# Patient Record
Sex: MALE | Race: WHITE | Employment: FULL TIME | URBAN - METROPOLITAN AREA
[De-identification: names, ages, dates, MRNs, and addresses within clinical notes are randomized per-mention and may not be internally consistent; named-entity substitution may affect disease eponyms.]

---

## 2021-02-09 NOTE — PROGRESS NOTES
3  SUBJECTIVE:   CC: Obey Stout is an 45 year old male who presents for preventive health visit.     Patient has been advised of split billing requirements and indicates understanding: Yes     Healthy Habits:    Do you get at least three servings of calcium containing foods daily (dairy, green leafy vegetables, etc.)? yes    Amount of exercise or daily activities, outside of work: on his feet for work all day    Problems taking medications regularly No    Medication side effects: No    Have you had an eye exam in the past two years? no    Do you see a dentist twice per year? yes    Do you have sleep apnea, excessive snoring or daytime drowsiness? yes      PROBLEMS TO ADD ON...  Hearing Loss  - patient is a [place] around [place]  - many loud rock concerts when he was younger  - not sure, but questioning his hearing acuity   - is interested in getting his hearing tested    Sex Drive and erectile dysfunction  - has been doing some on line research and seen some information about possible low testosterone  - is interested in testing for this    Skin concerns  - his wife has notices a few bumps on his skin that she is concerned about, would like Obey to look into seeing a dermatologis    Previous ADHD diagnosis  - diagnosed as a child, was on medication for awhile but then stopped  - was previously seeing what sounds like a psychologist or a counselor to address this as an adult  - it doesn't sound like he was being prescribed stimulant medication at this time  - wanted to discuss treatment options with me regarding ADHD management        Today's PHQ-2 Score:   PHQ-2 ( 1999 [employer]) 2/12/2021   Q1: Little interest or pleasure in doing things 0   Q2: Feeling down, depressed or hopeless 0   PHQ-2 Score 0       Abuse: Current or Past(Physical, Sexual or Emotional)- No  Do you feel safe in your environment? Yes        Social History     Tobacco Use     Smoking status: Not on file   Substance Use Topics      "Alcohol use: Not on file     If you drink alcohol do you typically have >3 drinks per day or >7 drinks per week? No                      Last PSA: No results found for: PSA    Reviewed orders with patient. Reviewed health maintenance and updated orders accordingly - Yes    Reviewed and updated as needed this visit by clinical staff  Tobacco  Allergies  Meds  Problems  Med Hx  Surg Hx  Fam Hx  Soc Hx          Reviewed and updated as needed this visit by Provider  Tobacco  Allergies  Meds  Problems  Med Hx  Surg Hx  Fam Hx  Soc Hx         Past Medical History:   Diagnosis Date     ED (erectile dysfunction) 3/18/2019      History reviewed. No pertinent surgical history.    ROS:  CONSTITUTIONAL: NEGATIVE for fever, chills, change in weight  INTEGUMENTARY/SKIN: NEGATIVE for worrisome rashes, moles or lesions  EYES: NEGATIVE for vision changes or irritation  ENT: NEGATIVE for ear, mouth and throat problems  RESP: NEGATIVE for significant cough or SOB  CV: NEGATIVE for chest pain, palpitations or peripheral edema  GI: NEGATIVE for nausea, abdominal pain, heartburn, or change in bowel habits   male: negative for dysuria, hematuria, decreased urinary stream, urethral discharge  MUSCULOSKELETAL: NEGATIVE for significant arthralgias or myalgia  NEURO: NEGATIVE for weakness, dizziness or paresthesias  PSYCHIATRIC: NEGATIVE for changes in mood or affect    OBJECTIVE:   /73 (BP Location: Right arm, Patient Position: Left side, Cuff Size: Adult Large)   Pulse 68   Temp 96.9  F (36.1  C) (Temporal)   Resp 16   Ht 1.867 m (6' 1.5\")   Wt 95.3 kg (210 lb)   SpO2 96%   BMI 27.33 kg/m    EXAM:  GENERAL: healthy, alert and no distress  EYES: Eyes grossly normal to inspection, PERRL and conjunctivae and sclerae normal  HENT: ear canals and TM's normal, nose and mouth without ulcers or lesions  NECK: no adenopathy, no asymmetry, masses, or scars and thyroid normal to palpation  RESP: lungs clear to auscultation " - no rales, rhonchi or wheezes  CV: regular rate and rhythm, normal S1 S2, no S3 or S4, no murmur, click or rub, no peripheral edema and peripheral pulses strong  ABDOMEN: soft, nontender, no hepatosplenomegaly, no masses and bowel sounds normal  MS: no gross musculoskeletal defects noted, no edema  SKIN: no suspicious lesions or rashes  NEURO: Normal strength and tone, mentation intact and speech normal  PSYCH: mentation appears normal, affect normal/bright    Diagnostic Test Results:  Labs reviewed in Epic    ASSESSMENT/PLAN:   Obey was seen today for physical.    Diagnoses and all orders for this visit:    Routine general medical examination at a health care facility  -     Basic metabolic panel    Screening for lipid disorders  -     Lipid Profile    Screening for HIV (human immunodeficiency virus)  -     HIV Screening    Need for hepatitis C screening test  -     Hepatitis C Screen Reflex to HCV RNA Quant and Genotype    Hearing loss, unspecified hearing loss type, unspecified laterality  -     PURE TONE AUDIOMETRY, AIR    Advised patient he likely does not need a referral to see a dermatologist for a skin check. Provided with contact information for [facility]. Will provide referral if needed.     Audiometry ordered as noted above. Patient provided contact information for [facility].     Discussed the need for either documentation of previous diagnostic assessment of ADHD or I can provide suggestions for locations for new testing before consideration of stimulant medication for the treatment of ADHD. Patient would like to try and get a hold of his previous counselor to see if those records would suffice for documentation. I will wait to hear further from patient. I have also provided a list of possible places patient may consider contacting for new testing.     Patient has been advised of split billing requirements and indicates understanding: Yes  COUNSELING:  Reviewed preventive  "health counseling, as reflected in patient instructions    Estimated body mass index is 27.33 kg/m  as calculated from the following:    Height as of this encounter: 1.867 m (6' 1.5\").    Weight as of this encounter: 95.3 kg (210 lb).    Weight management plan: Discussed healthy diet and exercise guidelines    He reports that he quit smoking about 16 years ago. He has never used smokeless tobacco.      Counseling Resources:  ATP IV Guidelines  Pooled Cohorts Equation Calculator  FRAX Risk Assessment  ICSI Preventive Guidelines  Dietary Guidelines for Americans, 2010  USDA's MyPlate  ASA Prophylaxis  Lung CA Screening    Getachew Ribera MD  [facility]  "

## 2021-02-09 NOTE — PATIENT INSTRUCTIONS
Preventive Health Recommendations  Male Ages 40 to 49    Yearly exam:             See your health care provider every year in order to  o   Review health changes.   o   Discuss preventive care.    o   Review your medicines if your doctor has prescribed any.    You should be tested each year for STDs (sexually transmitted diseases) if you re at risk.     Have a cholesterol test every 5 years.     Have a colonoscopy (test for colon cancer) if someone in your family has had colon cancer or polyps before age 50.     After age 45, have a diabetes test (fasting glucose). If you are at risk for diabetes, you should have this test every 3 years.      Talk with your health care provider about whether or not a prostate cancer screening test (PSA) is right for you.    Shots: Get a flu shot each year. Get a tetanus shot every 10 years.     Nutrition:    Eat at least 5 servings of fruits and vegetables daily.     Eat whole-grain bread, whole-wheat pasta and brown rice instead of white grains and rice.     Get adequate Calcium and Vitamin D.     Lifestyle    Exercise for at least 150 minutes a week (30 minutes a day, 5 days a week). This will help you control your weight and prevent disease.     Limit alcohol to one drink per day.     No smoking.     Wear sunscreen to prevent skin cancer.     See your dentist every six months for an exam and cleaning.    [facility]  [address],  [address]  [place] [address]  Phone: [phone]    =======================================================    [address]  [place] [address] [phone]    [facility] offers ADHD testing evaluation for only 18+. It is 2-5 appointments    =======================================================    [facility]  [email or web address]  Children and adults    [place]: [phone]  [facility]  [place]: [phone]  [place]: [phone]  [place]: [phone]  [facility]: [phone]  [facility]: [phone]    =======================================================    [facility]  Various locations  [email or web address]  ADHD evaluations for ages 6+  General Number for multiple locations: [phone]

## 2021-02-12 ENCOUNTER — OFFICE VISIT (OUTPATIENT)
Dept: FAMILY MEDICINE | Facility: CLINIC | Age: 46
End: 2021-02-12
Payer: COMMERCIAL

## 2021-02-12 VITALS
WEIGHT: 210 LBS | TEMPERATURE: 96.9 F | SYSTOLIC BLOOD PRESSURE: 114 MMHG | BODY MASS INDEX: 26.95 KG/M2 | RESPIRATION RATE: 16 BRPM | DIASTOLIC BLOOD PRESSURE: 73 MMHG | HEIGHT: 74 IN | HEART RATE: 68 BPM | OXYGEN SATURATION: 96 %

## 2021-02-12 DIAGNOSIS — Z11.4 SCREENING FOR HIV (HUMAN IMMUNODEFICIENCY VIRUS): ICD-10-CM

## 2021-02-12 DIAGNOSIS — Z11.59 NEED FOR HEPATITIS C SCREENING TEST: ICD-10-CM

## 2021-02-12 DIAGNOSIS — H91.90 HEARING LOSS, UNSPECIFIED HEARING LOSS TYPE, UNSPECIFIED LATERALITY: ICD-10-CM

## 2021-02-12 DIAGNOSIS — Z13.220 SCREENING FOR LIPID DISORDERS: ICD-10-CM

## 2021-02-12 DIAGNOSIS — Z00.00 ROUTINE GENERAL MEDICAL EXAMINATION AT A HEALTH CARE FACILITY: Primary | ICD-10-CM

## 2021-02-12 PROBLEM — N52.9 ED (ERECTILE DYSFUNCTION): Status: ACTIVE | Noted: 2019-03-18

## 2021-02-12 LAB
ANION GAP SERPL CALCULATED.3IONS-SCNC: 2 MMOL/L (ref 3–14)
BUN SERPL-MCNC: 17 MG/DL (ref 7–30)
CALCIUM SERPL-MCNC: 9.3 MG/DL (ref 8.5–10.1)
CHLORIDE SERPL-SCNC: 106 MMOL/L (ref 94–109)
CHOLEST SERPL-MCNC: 235 MG/DL
CO2 SERPL-SCNC: 31 MMOL/L (ref 20–32)
CREAT SERPL-MCNC: 0.99 MG/DL (ref 0.66–1.25)
GFR SERPL CREATININE-BSD FRML MDRD: >90 ML/MIN/{1.73_M2}
GLUCOSE SERPL-MCNC: 84 MG/DL (ref 70–99)
HDLC SERPL-MCNC: 60 MG/DL
LDLC SERPL CALC-MCNC: 143 MG/DL
NONHDLC SERPL-MCNC: 175 MG/DL
POTASSIUM SERPL-SCNC: 4 MMOL/L (ref 3.4–5.3)
SODIUM SERPL-SCNC: 140 MMOL/L (ref 133–144)
TRIGL SERPL-MCNC: 161 MG/DL

## 2021-02-12 RX ORDER — SILDENAFIL CITRATE 20 MG/1
TABLET ORAL
COMMUNITY
Start: 2019-04-23

## 2021-02-12 SDOH — HEALTH STABILITY: MENTAL HEALTH: HOW OFTEN DO YOU HAVE 6 OR MORE DRINKS ON ONE OCCASION?: NOT ASKED

## 2021-02-12 SDOH — HEALTH STABILITY: MENTAL HEALTH: HOW OFTEN DO YOU HAVE A DRINK CONTAINING ALCOHOL?: 4 OR MORE TIMES A WEEK

## 2021-02-12 SDOH — HEALTH STABILITY: MENTAL HEALTH: HOW MANY STANDARD DRINKS CONTAINING ALCOHOL DO YOU HAVE ON A TYPICAL DAY?: 1 OR 2

## 2021-02-12 ASSESSMENT — MIFFLIN-ST. JEOR: SCORE: 1899.36

## 2021-02-12 NOTE — NURSING NOTE
"45 year old  Chief Complaint   Patient presents with     Physical     45 yrs old non fasting        Blood pressure 114/73, pulse 68, temperature 96.9  F (36.1  C), temperature source Temporal, resp. rate 16, height 1.867 m (6' 1.5\"), weight 95.3 kg (210 lb), SpO2 96 %. Body mass index is 27.33 kg/m .  Patient Active Problem List   Diagnosis     ED (erectile dysfunction)       Wt Readings from Last 2 Encounters:   02/12/21 95.3 kg (210 lb)     BP Readings from Last 3 Encounters:   02/12/21 114/73         Current Outpatient Medications   Medication     sildenafil (REVATIO) 20 MG tablet     No current facility-administered medications for this visit.        Social History     Tobacco Use     Smoking status: Never Smoker     Smokeless tobacco: Never Used   Substance Use Topics     Alcohol use: Yes     Frequency: 4 or more times a week     Drinks per session: 1 or 2     Drug use: Not Currently       Health Maintenance Due   Topic Date Due     PREVENTIVE CARE VISIT  1975     HIV SCREENING  04/05/1990     HEPATITIS C SCREENING  04/05/1993     LIPID  04/05/2010     PHQ-2  01/01/2021       No results found for: PAP      February 12, 2021 3:01 PM  "

## 2021-02-14 LAB
HCV AB SERPL QL IA: NONREACTIVE
HIV 1+2 AB+HIV1 P24 AG SERPL QL IA: NONREACTIVE

## 2021-06-02 ENCOUNTER — MYC MEDICAL ADVICE (OUTPATIENT)
Dept: FAMILY MEDICINE | Facility: CLINIC | Age: 46
End: 2021-06-02

## 2021-06-02 DIAGNOSIS — R23.9 SKIN CHANGE: Primary | ICD-10-CM

## 2021-06-02 NOTE — TELEPHONE ENCOUNTER
Patient requesting referral to dermatology for general skin check.    Diagnoses and all orders for this visit:    Skin change  -     ADULT DERMATOLOGY REFERRAL; Future      Getachew Ribera MD  10:14 AM, June 2, 2021

## 2021-06-22 ENCOUNTER — TELEPHONE (OUTPATIENT)
Dept: BEHAVIORAL HEALTH | Facility: CLINIC | Age: 46
End: 2021-06-22

## 2021-06-22 NOTE — TELEPHONE ENCOUNTER
[facility] rec'd referral for pt from PCP, requesting C contact pt to educate about services, answer pt questions, and assist with scheduling if needed.     [facility] contacted and spoke directly with pt, but he was not able to speak at this time; pt was not in any distress.  [facility] and pt scheduled time to speak further about services on 6/24/21, 4:30 pm ([facility] will call pt).        Shawn Ullrich LICSW, [facility]  Behavioral Health Clinician  [facility]

## 2021-06-24 ENCOUNTER — TELEPHONE (OUTPATIENT)
Dept: BEHAVIORAL HEALTH | Facility: CLINIC | Age: 46
End: 2021-06-24

## 2021-06-24 NOTE — TELEPHONE ENCOUNTER
"[facility] rec'd referral for pt from PCP, requesting [facility] contact pt to educate about services, answer pt questions, and assist with scheduling if needed.     [facility] contacted and spoke directly with pt, who was depressed and agitated. Pt stated he's tired of his current job and he's tired of applying for jobs and being rejected.  [facility] provided support and validation, Pt stated, nothing helps him to feel better anymore.  [facility] assessed for safety, Pt endorsed passive SI, but denied intent or plan, reported he has firearms in the home which are locked up and he would never use a gun, stating \"I\"m not gonna be one of those white guys that shoots himself with a gun\".  Pt affirmed his safety, stated he's not going to take any action to harm himself, and that things have not been good for 2 years, therefore he's not all of a sudden going to hurt himself now.  [facility] assessed for alcohol/substance use, pt stated, \"I don't drink, I don't do drugs\".   Pt resides with wife who is supportive, spent time with friends last night, and also has cousin in the area.  Pt was future oriented, as evidenced when [facility] attempted to schedule appt for next week, and pt declined due to work schedule and already having plans with friend who's coming in from out of town.  Pt scheduled appt in two weeks, but stated he will check with his insurance to make sure [facility] is in-network.      [facility] offered and educated pt about crisis services, including [facility], pt was receptive to potentially using these services if needs additional support and affirmed receiving crisis information via [employer].  [facility] will also provide [facility]'s contact information via [employer] and encouraged pt to contact [facility] if he has additional questions or needs additional support.       Shawn Ullrich LICSW, [facility]  Behavioral Health Clinician   [facility]         "

## 2021-07-07 NOTE — PROGRESS NOTES
"[facility]  Provider Name:  Shawn Ullrich      Credentials:  GERARDO, MONI    PATIENT'S NAME: bOey Stout  PREFERRED NAME: Obey  PRONOUNS:   he/him    MRN: [id number]  : 1975  ADDRESS: [address] [place] [id number]  ACCT. NUMBER:  [id number]  DATE OF SERVICE: 21  START TIME: 3:22 pm  END TIME: 3:59 pm  PREFERRED PHONE: [phone]  May we leave a program related message: Yes    SERVICE MODALITY:  Telephone Visit:      *[facility] initiated appt as scheduled 3:00pm video visit; pt did not present, so [facility] contacted pt via telephone (3:08pm).  [facility] spoke directly with pt who stated he believe the appt started at 4:00 pm and was on his way home from work.  Pt stated he would be home in minutes and then join [facility] for video visit.  By the time pt attempted to initiate the video visit the appt had closed, ie pt unable to join via video, so [facility] and Pt conducted appt via telephone.       Obey Stout is a 46 year old male who is being evaluated via a telephone visit.      The patient has been notified of the following:     \"We have found that certain health care needs can be provided without the need for a face to face visit.  This service lets us provide the care you need with a short phone conversation.      I will have full access to your [place] medical record during this entire phone call.   I will be taking notes for your medical record.     Since this is like an office visit, we will bill your insurance company for this service.  Please check with your medical insurance if this type of telephone visit/virtual care is covered.  You may be responsible for the cost of this service if insurance coverage is denied.      There are potential benefits and risks of telephone visits (e.g. limits to patient confidentiality) that differ from in-person visits.?  Confidentiality still applies for telephone services, and nobody will record the visit.  It is " "important to be in a quiet, private space that is free of distractions (including cell phone or other devices) during the visit.??     If during the course of the call I believe a telephone visit is not appropriate, you will not be charged for this service\"    Consent has been obtained for this service by care team member: yes.        Behavioral Health Clinician Progress Note    Patient Name: Obey Stout           Service Type:  Individual      Service Location:  telemedicine     Session Start Time: 3:22 pm Session End Time: 3:59 pm      Session Length: 16 - 37      Attendees: Client    Visit Activities (Refresh list every visit): [facility] and [facility] Only    Diagnostic Assessment Date: NA; unable to complete during initial session  Treatment Plan Review Date: NA  CGI Review Date: 10/8/21      Clinical Global Impressions  First:  Considering your total clinical experience with this particular patient population, how severe are the patient's symptoms at this time?: 4 (7/8/2021  3:30 PM)    Most recent:  No data recorded    See Flowsheets for today's PHQ-9 and AMBER-7 results  Previous PHQ-9:   PHQ-9 SCORE 7/8/2021   PHQ-9 Total Score MyChart 10 (Moderate depression)   PHQ-9 Total Score 10     Previous AMBER-7:   AMBER-7 SCORE 7/8/2021   Total Score 10 (moderate anxiety)   Total Score 10       KAILASH LEVEL:  No flowsheet data found.    DATA  Extended Session (60+ minutes): No  Interactive Complexity: No  Crisis: No  [facility] Patient: No    Treatment Objective(s) Addressed in This Session:  Target Behavior(s): increase engagement in healthy activities    Depressed Mood: Increase interest, engagement, and pleasure in doing things  Decrease frequency and intensity of feeling down, depressed, hopeless  Feel less tired and more energy during the day   Identify negative self-talk and behaviors: challenge core beliefs, myths, and actions  Improve concentration, focus, and mindfulness in daily activities   Decrease thoughts that you'd be " "better off dead or of suicide / self-harm    Current Stressors / Issues:  [facility] unable to complete diagnostic assessment during initial visit.  [facility] assessed for safety, gathered information, established rapport and began developing therapeutic alliance.  Pt denied past current SI/SA/SIB or other safety concerns; denied current alcohol/substance use issues.      Pt presented as irritable and angry, fully alert and oriented, with clear and coherent thought process.  When inquiring about presenting concern, circumstances, and reason for scheduling appt., pt stated, \"I work a shitty retail job...I'm really angry...I need to externalize it (reason for seeking therapy services)\".  Pt reported he was fired from previous job as a  on April 5, 2019, and has not been able to find employment in his field since, despite applying to numerous jobs.  Pt reported his mood has worsened over the past several months due to feeling discouraged about the employment search.       [facility] assessed for safety, and while pt endorsed SI several days per week, he denied current intent and denied worsening of SI since phone encounter on 6/24/21 (see note for addition safety information and resources provided).  When [facility] further inquired about plan, pt denied specific plan, reported vague method of \"pills\", but again, no specific pills, not hoarding of pills, etc.  Pt provided further clarification of SI, stating when SI occur he typically doesn't think of method or plan, he just thinks about the end, ie he's not seeking to kill/harm himself, rather its a way to NOT experience his current feelings and circumstances.  Pt denied past SI, SA, SIB or psychiatric hospitalizations.  Pt was future oriented, identified protective factors and supports (\"My wife is the best thing, she's my rock\"; also has friends/family in the area).  Pt has firearms, but they are secure and he does not see them as plan or method (see note from 6/24).  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  Pt shared how hunting is an enjoyable activity that provides a challenge, provides a space to learn and demonstrate skills/knowledge, connects him with friends, and connects him to nature. Pt identified other hobbies and has recently engaged with supports/friends. Pt continues to work and while he doesn't enjoy his current job, he identified his supervisor as supportive and gets along with supervisor and co-workers.  Pt affirmed safety and denied current concerns about remaining safe.  [facility] already educated and provided crisis resources, if needed.      Pt reported history of ADHD, including testing, and took Ritalin when in college.  Pt denied any other mental health services or history.     Pt endorsed the following:  Sleep: poor quality   Appetite: decreased  Energy Level: fluctuates   Decreased Concentration  Anhedonia  hopelessness  SI: see above  Anxiety/Worry  Irritability/anger    [facility] provided education about treatment options including:  -medication evaluation with PCP  -ADHD resources, including where/how to schedule evaluation and ADHD specific books with information and strategies.   -Therapy  -crisis resources    Progress on Treatment Objective(s) / Homework:  New Objective established this session - CONTEMPLATION (Considering change and yet undecided); Intervened by assessing the negative and positive thinking (ambivalence) about behavior change    Motivational Interviewing    MI Intervention: Co-Developed Goal: decrease anger, irritability, and SI, Expressed Empathy/Understanding, Supported Autonomy, Collaboration, Evocation, Open-ended questions, Reflections: simple and complex and Change talk (evoked)     Change Talk Expressed by the Patient: Desire to change Reasons to change    Provider Response to Change Talk: E - Evoked more info from patient about behavior change, A - Affirmed patient's thoughts, decisions, or  attempts at behavior change, R - Reflected patient's change talk and S - Summarized patient's change talk statements    Also provided psychoeducation about behavioral health condition, symptoms, and treatment options    Care Plan review completed: Yes    Medication Review:  No current psychiatric medications prescribed    Medication Compliance:  NA    Changes in Health Issues:   None reported    Chemical Use Review:   Substance Use: Chemical use reviewed, no active concerns identified      Tobacco Use: No current tobacco use.      Assessment: Current Emotional / Mental Status (status of significant symptoms):  Risk status (Self / Other harm or suicidal ideation)  Patient denies a history of suicidal ideation, suicide attempts, self-injurious behavior, homicidal ideation, homicidal behavior and and other safety concerns  Patient denies current fears or concerns for personal safety.  Patient reports the following current or recent suicidal ideation or behaviors: see above.  Patient denies current or recent homicidal ideation or behaviors.  Patient denies current or recent self injurious behavior or ideation.  Patient denies other safety concerns.  A safety and risk management plan has not been developed at this time, however patient was encouraged to call [place] [phone] should there be a change in any of these risk factors.    Appearance:   Unable to assess due to telephone visit   Eye Contact:   NA   Psychomotor Behavior: NA   Attitude:   Cooperative   Orientation:   All  Speech   Rate / Production: Emotional   Volume:  Normal   Mood:    Angry  Irritable   Affect:    NA   Thought Content:  Clear   Thought Form:  Coherent  Logical   Insight:    Fair     Diagnoses:  F32.1  Major Depressive Disorder, Single Episode, moderate    Collateral Reports Completed:  Routed note to PCP    Plan: (Homework, other):  Patient was given information about behavioral services and encouraged to schedule a follow up appointment with  the clinic [facility] in 3 weeks.  He was also given information about mental health symptoms and treatment options .  CD Recommendations: No indications of CD issues.  Shawn Ullrich LIC, [facility]    *Post Visit: Pt and [facility] initially scheduled follow up [facility] appt 3 weeks.  After further consideration, [facility] determined pt would benefit from quicker access to psychotherapy services, and [facility] can facilitate quicker access by making referral to psychotherapy services.  On 7/9/21, [facility] called and spoke to pt regarding wanting to expedite his access his mental health services by making referral to psychotherapy services (see phone encounter note from 7/9/21); [facility] completed referral.     ______________________________________________________________________    Integrated Primary Care Behavioral Health Treatment Plan    Patient's Name: Obey Stout  YOB: 1975    Date: 7/8/21    *[facility] unable to complete tx plan during initial appt, due to assessment and psychoeducation.  Pt identified goals of wanting to improve mood, and decrease anger and hopelessness (SI).  [facility] referring to psychotherapy services.       Shawn G. Ullrich, GERARDO  July 8, 2021

## 2021-07-08 ENCOUNTER — VIRTUAL VISIT (OUTPATIENT)
Dept: BEHAVIORAL HEALTH | Facility: CLINIC | Age: 46
End: 2021-07-08
Payer: COMMERCIAL

## 2021-07-08 DIAGNOSIS — F32.1 MAJOR DEPRESSIVE DISORDER, SINGLE EPISODE, MODERATE (H): Primary | ICD-10-CM

## 2021-07-08 ASSESSMENT — COLUMBIA-SUICIDE SEVERITY RATING SCALE - C-SSRS
6. HAVE YOU EVER DONE ANYTHING, STARTED TO DO ANYTHING, OR PREPARED TO DO ANYTHING TO END YOUR LIFE?: NO
TOTAL  NUMBER OF ABORTED OR SELF INTERRUPTED ATTEMPTS PAST LIFETIME: NO
1. IN THE PAST MONTH, HAVE YOU WISHED YOU WERE DEAD OR WISHED YOU COULD GO TO SLEEP AND NOT WAKE UP?: YES
TOTAL  NUMBER OF ABORTED OR SELF INTERRUPTED ATTEMPTS PAST 3 MONTHS: NO
6. HAVE YOU EVER DONE ANYTHING, STARTED TO DO ANYTHING, OR PREPARED TO DO ANYTHING TO END YOUR LIFE?: NO
TOTAL  NUMBER OF INTERRUPTED ATTEMPTS LIFETIME: NO
4. HAVE YOU HAD THESE THOUGHTS AND HAD SOME INTENTION OF ACTING ON THEM?: NO
ATTEMPT PAST THREE MONTHS: NO
2. HAVE YOU ACTUALLY HAD ANY THOUGHTS OF KILLING YOURSELF?: YES
5. HAVE YOU STARTED TO WORK OUT OR WORKED OUT THE DETAILS OF HOW TO KILL YOURSELF? DO YOU INTEND TO CARRY OUT THIS PLAN?: NO
5. HAVE YOU STARTED TO WORK OUT OR WORKED OUT THE DETAILS OF HOW TO KILL YOURSELF? DO YOU INTEND TO CARRY OUT THIS PLAN?: NO
1. IN THE PAST MONTH, HAVE YOU WISHED YOU WERE DEAD OR WISHED YOU COULD GO TO SLEEP AND NOT WAKE UP?: NO
TOTAL  NUMBER OF INTERRUPTED ATTEMPTS PAST 3 MONTHS: NO
3. HAVE YOU BEEN THINKING ABOUT HOW YOU MIGHT KILL YOURSELF?: NO
REASONS FOR IDEATION PAST MONTH: MOSTLY TO END OR STOP THE PAIN (YOU COULDN'T GO ON LIVING WITH THE PAIN OR HOW YOU WERE FEELING)
4. HAVE YOU HAD THESE THOUGHTS AND HAD SOME INTENTION OF ACTING ON THEM?: NO
ATTEMPT LIFETIME: NO
2. HAVE YOU ACTUALLY HAD ANY THOUGHTS OF KILLING YOURSELF LIFETIME?: NO

## 2021-07-08 ASSESSMENT — ANXIETY QUESTIONNAIRES
7. FEELING AFRAID AS IF SOMETHING AWFUL MIGHT HAPPEN: SEVERAL DAYS
2. NOT BEING ABLE TO STOP OR CONTROL WORRYING: SEVERAL DAYS
3. WORRYING TOO MUCH ABOUT DIFFERENT THINGS: SEVERAL DAYS
6. BECOMING EASILY ANNOYED OR IRRITABLE: MORE THAN HALF THE DAYS
4. TROUBLE RELAXING: MORE THAN HALF THE DAYS
5. BEING SO RESTLESS THAT IT IS HARD TO SIT STILL: SEVERAL DAYS
7. FEELING AFRAID AS IF SOMETHING AWFUL MIGHT HAPPEN: SEVERAL DAYS
1. FEELING NERVOUS, ANXIOUS, OR ON EDGE: MORE THAN HALF THE DAYS
GAD7 TOTAL SCORE: 10

## 2021-07-08 ASSESSMENT — PATIENT HEALTH QUESTIONNAIRE - PHQ9
10. IF YOU CHECKED OFF ANY PROBLEMS, HOW DIFFICULT HAVE THESE PROBLEMS MADE IT FOR YOU TO DO YOUR WORK, TAKE CARE OF THINGS AT HOME, OR GET ALONG WITH OTHER PEOPLE: VERY DIFFICULT
SUM OF ALL RESPONSES TO PHQ QUESTIONS 1-9: 10
SUM OF ALL RESPONSES TO PHQ QUESTIONS 1-9: 10

## 2021-07-09 ENCOUNTER — TELEPHONE (OUTPATIENT)
Dept: BEHAVIORAL HEALTH | Facility: CLINIC | Age: 46
End: 2021-07-09

## 2021-07-09 DIAGNOSIS — F43.23 ADJUSTMENT DISORDER WITH MIXED ANXIETY AND DEPRESSED MOOD: Primary | ICD-10-CM

## 2021-07-09 DIAGNOSIS — F32.1 MAJOR DEPRESSIVE DISORDER, SINGLE EPISODE, MODERATE (H): ICD-10-CM

## 2021-07-09 ASSESSMENT — PATIENT HEALTH QUESTIONNAIRE - PHQ9: SUM OF ALL RESPONSES TO PHQ QUESTIONS 1-9: 10

## 2021-07-09 ASSESSMENT — ANXIETY QUESTIONNAIRES: GAD7 TOTAL SCORE: 10

## 2021-07-09 NOTE — TELEPHONE ENCOUNTER
[facility] contacted pt, spoke directly to him via telephone, and offered to make a referral to a therapist within the community in order to expedite his access to psychotherapy services (pt can schedule and engage in therapy services sooner with a community provider, rather than waiting 3 weeks for an appt with [facility]).  Pt affirmed understanding and accepted referral.  [facility] educated pt regarding referral process and will send contact information to him via [facility].  [facility] will make referral and cancel future appt with [facility].    Shawn Ullrich LICSW, [facility]  Behavioral Health Clinician

## 2021-08-09 ENCOUNTER — OFFICE VISIT (OUTPATIENT)
Dept: DERMATOLOGY | Facility: CLINIC | Age: 46
End: 2021-08-09
Payer: COMMERCIAL

## 2021-08-09 DIAGNOSIS — R23.9 SKIN CHANGE: ICD-10-CM

## 2021-08-09 DIAGNOSIS — D22.9 MULTIPLE BENIGN NEVI: ICD-10-CM

## 2021-08-09 DIAGNOSIS — L28.0 LICHEN SIMPLEX CHRONICUS: Primary | ICD-10-CM

## 2021-08-09 DIAGNOSIS — L82.1 SEBORRHEIC KERATOSIS: ICD-10-CM

## 2021-08-09 PROCEDURE — 99203 OFFICE O/P NEW LOW 30 MIN: CPT | Performed by: PHYSICIAN ASSISTANT

## 2021-08-09 RX ORDER — TRIAMCINOLONE ACETONIDE 1 MG/G
OINTMENT TOPICAL 2 TIMES DAILY
Qty: 30 G | Refills: 0 | Status: SHIPPED | OUTPATIENT
Start: 2021-08-09

## 2021-08-09 NOTE — LETTER
8/9/2021       RE: Obey Stout  [address] [facility] [address]     Dear Colleague,    Thank you for referring your patient, Obey Stout, to the [facility]. Please see a copy of my visit note below.    [facility] Dermatology Note  Encounter Date: Aug 9, 2021  Office Visit     Dermatology Problem List:  #. Benign FBSE 8/9/2021   #. Rash, right lower leg  - triamcinolone 0.1% ointment  ____________________________________________    Assessment & Plan:    #. Lichen simplex chronicus, right lower leg.   Recommend gentle skin cares, avoid long, hot showers, use moisturizer daily.   Return precautions given if the area does not resolve in 2-3 weeks or appears to be worsening.  - Start triamcinolone 0.1% ointment bid.     #. Multiple benign nevi.   - No concerning lesions today  - Counseled on ABCDEs of melanoma and sun protection - recommend SPF 30 or higher with frequent application   - Return sooner if noticing changing or symptomatic lesions    #. Seborrheic keratoses  #. Solar lentigines   Discussed the natural history and benign nature of this lesion. Reassurance provided that no additional treatment is necessary.    Procedures Performed:   None    Follow-up: 2 year(s) or earlier for new or changing lesions    Staff and Scribe:     Provider Disclosure:   The documentation recorded by the scribe accurately reflects the services I personally performed and the decisions made by me.    All risks, benefits and alternatives were discussed with patient.  Patient is in agreement and understands the assessment and plan.  All questions were answered.  Sun Screen Education was given.   Return to Clinic in 1-2 years or sooner as needed.   Autumn Sanchez PA-C   [facility]     Scribe Disclosure:  I, Julienne Melo, am serving as a scribe to document  services personally performed by Autumn Sanchez PA-C based on data collection and the provider's statements to me.   ____________________________________________    CC: Skin Check (Obey is here today for a skin check - Has a few areas on his back of concern. )      HPI:  Mr. Obey Stout is a(n) 46 year old male who presents today as a new patient for FBSE.    The patient reports that his father has had several BCCs in the past and his mother has just recently had melanoma removed. The patient himself reports several lesions treated with Ln2 in the past, but denies personal history of skin cancer.    Today, the patient reports a spot on his right forearm that he is concerned about. He states that he has had his fair share of sun exposure over the course of his life, but he is diligent with SPF currently and does not report any significant sunburns. Lastly, the patient reports a rash on his right lower leg which has been present for some time and is intermittently itchy and red.     Patient is otherwise feeling well, without additional skin concerns.    Labs Reviewed:  N/A    Physical Exam:  Vitals: There were no vitals taken for this visit.  SKIN: Total skin excluding the undergarment areas was performed. The exam included the head/face, neck, both arms, chest, back, abdomen, both legs, digits and/or nails.   - Thin plaque on the right lower leg, slightly lichenified.  - Mild xerosis noted to bilateral lower legs.  - There are waxy stuck on tan to brown papules on the trunk and extremities, including the right forearm.   - Multiple regular brown pigmented macules and papules are identified on the trunk and extremities.   - Scattered brown macules on sun exposed areas.  - No other lesions of concern on areas examined.     Medications:  Current Outpatient Medications   Medication     sildenafil (REVATIO) 20 MG tablet     triamcinolone (KENALOG) 0.1 % external ointment     No current  facility-administered medications for this visit.      Past Medical History:   Patient Active Problem List   Diagnosis     ED (erectile dysfunction)     Past Medical History:   Diagnosis Date     ED (erectile dysfunction) 3/18/2019     CC Getachew Ribera MD  [address] [address] on close of this encounter.

## 2021-08-09 NOTE — NURSING NOTE
Dermatology Rooming Note    Obey Stout's goals for this visit include:   Chief Complaint   Patient presents with     Skin Check     Obey is here today for a skin check - Has a few areas on his back of concern.      Angela Gamez LPN

## 2021-08-09 NOTE — PROGRESS NOTES
[facility] Dermatology Note  Encounter Date: Aug 9, 2021  Office Visit     Dermatology Problem List:  #. Benign FBSE 8/9/2021   #. Rash, right lower leg  - triamcinolone 0.1% ointment  ____________________________________________    Assessment & Plan:    #. Lichen simplex chronicus, right lower leg.   Recommend gentle skin cares, avoid long, hot showers, use moisturizer daily.   Return precautions given if the area does not resolve in 2-3 weeks or appears to be worsening.  - Start triamcinolone 0.1% ointment bid.     #. Multiple benign nevi.   - No concerning lesions today  - Counseled on ABCDEs of melanoma and sun protection - recommend SPF 30 or higher with frequent application   - Return sooner if noticing changing or symptomatic lesions    #. Seborrheic keratoses  #. Solar lentigines   Discussed the natural history and benign nature of this lesion. Reassurance provided that no additional treatment is necessary.    Procedures Performed:   None    Follow-up: 2 year(s) or earlier for new or changing lesions    Staff and Scribe:     Provider Disclosure:   The documentation recorded by the scribe accurately reflects the services I personally performed and the decisions made by me.    All risks, benefits and alternatives were discussed with patient.  Patient is in agreement and understands the assessment and plan.  All questions were answered.  Sun Screen Education was given.   Return to Clinic in 1-2 years or sooner as needed.   Autumn Sanchez PA-C   [facility]     Scribe Disclosure:  I, Julienne Melo, am serving as a scribe to document services personally performed by Autumn Sanchez PA-C based on data collection and the provider's statements to me.   ____________________________________________    CC: Skin Check (Obey is here today for a skin check - Has a few areas on his back of concern. )      HPI:  Mr. Obey Stout is a(n) 46 year  old male who presents today as a new patient for FBSE.    The patient reports that his father has had several BCCs in the past and his mother has just recently had melanoma removed. The patient himself reports several lesions treated with Ln2 in the past, but denies personal history of skin cancer.    Today, the patient reports a spot on his right forearm that he is concerned about. He states that he has had his fair share of sun exposure over the course of his life, but he is diligent with SPF currently and does not report any significant sunburns. Lastly, the patient reports a rash on his right lower leg which has been present for some time and is intermittently itchy and red.     Patient is otherwise feeling well, without additional skin concerns.    Labs Reviewed:  N/A    Physical Exam:  Vitals: There were no vitals taken for this visit.  SKIN: Total skin excluding the undergarment areas was performed. The exam included the head/face, neck, both arms, chest, back, abdomen, both legs, digits and/or nails.   - Thin plaque on the right lower leg, slightly lichenified.  - Mild xerosis noted to bilateral lower legs.  - There are waxy stuck on tan to brown papules on the trunk and extremities, including the right forearm.   - Multiple regular brown pigmented macules and papules are identified on the trunk and extremities.   - Scattered brown macules on sun exposed areas.  - No other lesions of concern on areas examined.     Medications:  Current Outpatient Medications   Medication     sildenafil (REVATIO) 20 MG tablet     triamcinolone (KENALOG) 0.1 % external ointment     No current facility-administered medications for this visit.      Past Medical History:   Patient Active Problem List   Diagnosis     ED (erectile dysfunction)     Past Medical History:   Diagnosis Date     ED (erectile dysfunction) 3/18/2019     CC Getachew Ribera MD  [address] [place] [address] on close of this encounter.

## 2021-08-09 NOTE — PATIENT INSTRUCTIONS
"Overview    Seborrheic keratoses are common benign growths of unknown cause seen in adults   due to a thickening of an area of the top skin layer.    Who's At Risk  Although they can occur anytime after puberty, almost everyone over 50 has one or more of these and they increase in number with age. Some families have an inherited tendency to grow multiple lesions. Men and women are equally as likely to develop seborrheic keratoses. Dark-skinned people are less affected than those with light skin; a variant seen in blacks is called dermatosis papulosa nigra.    Signs & Symptoms  One or more spots can occur anywhere on the body, except for palms, soles, and mucous membranes (eg, in the mouth or rectum). They do not go away. They do not turn into cancers, but some cancers resemble seborrheic keratosis.    They start as light brown to skin-colored, flat areas, which are round to oval and of varying size (usually less than a half inch, but sometimes much larger). As they grow thicker and rise above the skin surface, seborrheic keratoses may become dark brown to almost black with a \"stuck on\" appearance. The surface may feel smooth or rough.    Self-Care Guidelines  No treatment is needed unless there is irritation from clothing with itching or bleeding.    There is no way to prevent new spots from forming.    Some lotions with alpha hydroxyl acids may make the areas feel smoother with regular use but will not eliminate them.    OTC freezing techniques are available but usually not effective.    When to Seek Medical Care  If a spot on the skin is growing, bleeding, painful, or itchy, see your doctor.    Spots can be removed if you don't want them, but removal is considered a cosmetic issue and is usually not covered by insurance.    Treatments Your Physician May Prescribe  Removal can be accomplished with freezing (cryosurgery), scraping (curettage), burning (electrocautery), lasers, or with acids. The doctor might conduct " a biopsy if the growth looks unusual.    References:  Abel Sahu, ed. Dermatology, pp.0316-1487. [place]: [employer], 2003.    Rosas Foster, ed. Cheng's Dermatology in General Medicine. 6th ed, pp.767-770. [facility], [place]: [facility], 2003.        SUN PROTECTION    WHY PROTECT AGAINST THE SUN?  In the past, sun exposure was thought to be a healthy benefit of outdoor activity. However, studies have shown many unhealthy effects of sun exposure, such as early aging of the skin and skin cancer.    WHAT KIND OF DAMAGE DOES THE SUN EXPOSURE CAUSE?  Part of the sun s energy that reaches earth is composed of rays of invisible ultraviolet (UV) light. When ultraviolet light rays (UVA and UVB) enter the skin, they damage skin cells, causing visible and invisible injuries.    Sunburn is a visible type of damage, which appears just a few hours after sun exposure. In many people this type of damage also causes tanning. Freckles, which occur in people with fair skin, are usually due to sun exposure. Freckles are nearly always a sign that sun damage has occurred, and therefore show the need for sun protection.    Ultraviolet light rays also cause invisible damage to skin cells. Some of the injury is repaired but some of the cell damage adds up year after year. After 20-30 years or more, the built-up damage appears as wrinkles, age spots and even skin cancer.  Although window glass blocks UVB light, UVA rays are able to penetrate through the glass.    HOW CAN I PROTECT MY CHILD FROM EXCESSIVE SUN EXPOSURE?  1. Avoidance. Plan your activities to avoid being in the sun in the middle of the day. Sun exposure is more intense closer to the equator, in the mountains and in the summer. The sun s damaging effects are increased by reflection from water, white sand and snow. Avoid long periods of direct sun exposure. Sit or play in the shade, especially when your shadow is shorter then you are tall. Stay out of the sun during peak  hours of 10 am - 2 pm.   2. Use protective clothing.  Cover up with light colored clothing when outdoors including a hat to protect the scalp and face. In addition to filtering out the sun, tightly woven clothing reflects heat and helps keep you feeling cool. Sunglasses that block ultraviolet rays protect the eyes and eyelids. Multiple retailers now sell clothing and swimwear for adults and children that is made of special fabric that protects against the sun.    3. Apply a broad-spectrum UVA and UVB sunscreen with an SPF of 30 of higher and reapply approximately every two hours, even on cloudy days. If swimming or participating in intense physical activity, sunscreen may need to be applied more often.   4. Infants should be kept out of direct sun and be covered by protective clothing when possible. If sun exposure is unavoidable, sunscreen should be applied to exposed areas (i.e. face, hands).    IS SUNSCREEN SAFE?  Hats, clothing and shade are the most reliable forms of sun protection, but sunscreen is also an important part of protecting your child from the sun. Some have raised concerns about chemical sunscreens and the dangers of absorption. Most of this concern is theoretical, and our providers would be happy to discuss this with you.  Most dermatologists agree that the risk of unprotected sun exposure far outweighs the theoretical risks of sunscreens.      WHAT IF I HAVE AN INFANT OR YOUNG CHILD WITH SENSITIVE SKIN?  The following sunscreens may be better for your child s sensitive skin. The main active ingredients are inert, either titanium dioxide or zinc oxide. These ingredients are less irritating than chemical sunscreens.   Be wary of the word  baby  or  organic : these words don t always mean that the product is hypoallergenic.  Please also note that this list is not all-inclusive, and that we do not formally endorse any of these products.     Aveeno Active Natural Protection Mineral Block Lotion SPF  30  Aveeno Baby Natural Protection Face Stick SPF 50+  Banana Boat Natural Reflect (baby or kids) SPF 50+  Bare Republic SPR 50 Stick   Beauty Countersun Mineral Sunscreen Stick SPF 30  [place] s Bees Chemical-Free Sunscreen SPF 30  Blue Lizard Baby SPF 30+  Blue Lizard for Sensitive Skin SPF 30+  Cotz Pediatric Pure SPF 30  Cotz Pediatric Face SPF 40  Cotz 20% Zinc SPF 35  CVS Sensitive Skin 30  CVS Baby Lotion Sunscreen SPF 60+  EltaMD UV Physical Broad-Spectrum SPF 41  La Roche-Posay Anthelios Mineral Zinc Oxide Sunscreen SPF 50  Mustella Broad Spectrum SPF 50+/Mineral Sunscreen Stick  Neutrogena Sensitive Skin- Pure and Free Baby SPF 30  Neutrogena Sensitive Skin-Pure and Free Baby  SPF 50+  Neutrogena Sheer Zinc Oxide Dry-Touch Face Sunscreen with Broad Spectrum SPF 50, Oil-Free, Non-Comedogenic & Non-Greasy Mineral Sunscreen  Thinkbaby Safe Sunscreen SPF 50+,   [place] Sunscreen SPF 50+,   PreSun Sensitive Sunblock SPF 28  Vanicream Sunscreen for Sensitive Skin SPF 30 or 50  [employer] s Sensitive Skin SPF 70    WHERE CAN I BUY SUN PROTECTIVE CLOTHING AND SWIMWEAR?   Many retailers sell these products.  Cool[employer]ar, Solumbra, Sunday Afternoons, and Athleta are some examples.  Many other popular children s brands have started selling UV protective swimwear, and we recommend swimsuits that include swim shirts and don t leave extra skin exposed.   UV protective products can also be washed into clothing (eg: Rit Sun Guard Laundry UV Protectant).     SHOULD I WORRY ABOUT MY CHILD NOT GETTING ENOUGH VITAMIN D?  Vitamin D is essential for many processes in the body, and it is important for bone growth in children.  But while the sun is one source of vitamin D, it is also the source of harmful ultraviolet radiation resulting in thousands of skin cancers each year. The official recommendation of the American Academy of Dermatology (AAD) is that vitamin D should be obtained through dietary sources and supplementation rather  than from sunlight.     For more information on sun safety and more FAQs about sun protection, visit:  http://www.aad.org/media-resources/stats-and-facts/prevention-and-care/sunscreens

## 2021-09-26 ENCOUNTER — HEALTH MAINTENANCE LETTER (OUTPATIENT)
Age: 46
End: 2021-09-26

## 2022-03-13 ENCOUNTER — HEALTH MAINTENANCE LETTER (OUTPATIENT)
Age: 47
End: 2022-03-13

## 2023-04-23 ENCOUNTER — HEALTH MAINTENANCE LETTER (OUTPATIENT)
Age: 48
End: 2023-04-23

## 2024-06-30 ENCOUNTER — HEALTH MAINTENANCE LETTER (OUTPATIENT)
Age: 49
End: 2024-06-30